# Patient Record
Sex: FEMALE | Race: WHITE | Employment: UNEMPLOYED | URBAN - METROPOLITAN AREA
[De-identification: names, ages, dates, MRNs, and addresses within clinical notes are randomized per-mention and may not be internally consistent; named-entity substitution may affect disease eponyms.]

---

## 2024-02-05 ENCOUNTER — APPOINTMENT (OUTPATIENT)
Dept: NEUROSURGERY | Facility: CLINIC | Age: 54
End: 2024-02-05
Payer: MEDICAID

## 2024-02-19 ENCOUNTER — OFFICE VISIT (OUTPATIENT)
Dept: NEUROSURGERY | Facility: CLINIC | Age: 54
End: 2024-02-19
Payer: MEDICAID

## 2024-02-19 VITALS
SYSTOLIC BLOOD PRESSURE: 128 MMHG | HEIGHT: 69 IN | TEMPERATURE: 97.7 F | BODY MASS INDEX: 26.66 KG/M2 | DIASTOLIC BLOOD PRESSURE: 82 MMHG | WEIGHT: 180 LBS

## 2024-02-19 DIAGNOSIS — M54.16 LUMBAR RADICULOPATHY, ACUTE: Primary | ICD-10-CM

## 2024-02-19 PROCEDURE — 99214 OFFICE O/P EST MOD 30 MIN: CPT | Performed by: STUDENT IN AN ORGANIZED HEALTH CARE EDUCATION/TRAINING PROGRAM

## 2024-02-19 RX ORDER — AMITRIPTYLINE HYDROCHLORIDE 25 MG/1
25 TABLET, FILM COATED ORAL
COMMUNITY
Start: 2022-03-28

## 2024-02-19 RX ORDER — LINACLOTIDE 290 UG/1
CAPSULE, GELATIN COATED ORAL
COMMUNITY
Start: 2020-11-20

## 2024-02-19 RX ORDER — ATORVASTATIN CALCIUM 40 MG/1
40 TABLET, FILM COATED ORAL DAILY
COMMUNITY
Start: 2022-04-21

## 2024-02-19 RX ORDER — ACETAMINOPHEN AND CODEINE PHOSPHATE 300; 30 MG/1; MG/1
1 TABLET ORAL
COMMUNITY
Start: 2023-10-24

## 2024-02-19 RX ORDER — CONJUGATED ESTROGENS AND MEDROXYPROGESTERONE ACETATE .45; 1.5 MG/1; MG/1
1 TABLET, SUGAR COATED ORAL DAILY
COMMUNITY
Start: 2024-01-22

## 2024-02-19 RX ORDER — BUDESONIDE, GLYCOPYRROLATE, AND FORMOTEROL FUMARATE 160; 9; 4.8 UG/1; UG/1; UG/1
AEROSOL, METERED RESPIRATORY (INHALATION)
COMMUNITY
Start: 2022-06-20

## 2024-02-19 RX ORDER — BUTALBITAL, ASPIRIN, AND CAFFEINE 325; 50; 40 MG/1; MG/1; MG/1
CAPSULE ORAL
COMMUNITY
Start: 2019-07-19

## 2024-02-19 RX ORDER — GABAPENTIN 100 MG/1
100 CAPSULE ORAL 2 TIMES DAILY
COMMUNITY
Start: 2019-11-21

## 2024-02-19 RX ORDER — LORATADINE 10 MG/1
TABLET ORAL
COMMUNITY
Start: 2022-03-28

## 2024-02-19 RX ORDER — PREGABALIN 50 MG/1
CAPSULE ORAL
COMMUNITY
Start: 2022-03-29

## 2024-02-19 RX ORDER — ADALIMUMAB 40MG/0.4ML
40 KIT SUBCUTANEOUS
COMMUNITY
Start: 2024-02-05

## 2024-02-19 RX ORDER — VENLAFAXINE 75 MG/1
75 TABLET ORAL EVERY 24 HOURS
COMMUNITY
Start: 2024-02-02

## 2024-02-19 RX ORDER — FLUTICASONE FUROATE, UMECLIDINIUM BROMIDE AND VILANTEROL TRIFENATATE 100; 62.5; 25 UG/1; UG/1; UG/1
1 POWDER RESPIRATORY (INHALATION) EVERY 24 HOURS
COMMUNITY
Start: 2024-01-26

## 2024-02-19 RX ORDER — TERBINAFINE HYDROCHLORIDE 250 MG/1
TABLET ORAL EVERY 24 HOURS
COMMUNITY
Start: 2024-02-16

## 2024-02-19 RX ORDER — TRAZODONE HYDROCHLORIDE 100 MG/1
TABLET ORAL
COMMUNITY
Start: 2023-06-16

## 2024-02-19 RX ORDER — HYDROXYZINE HYDROCHLORIDE 25 MG/1
TABLET, FILM COATED ORAL
COMMUNITY
Start: 2022-03-28

## 2024-02-19 RX ORDER — FAMOTIDINE 20 MG/1
20 TABLET, FILM COATED ORAL
COMMUNITY
Start: 2022-04-05

## 2024-02-19 RX ORDER — METOCLOPRAMIDE 5 MG/1
TABLET ORAL
COMMUNITY

## 2024-02-19 RX ORDER — BISACODYL 5 MG
5 TABLET, DELAYED RELEASE (ENTERIC COATED) ORAL EVERY 24 HOURS
COMMUNITY
Start: 2024-01-08

## 2024-02-19 RX ORDER — ZINC GLUCONATE 10 MG
LOZENGE ORAL
COMMUNITY

## 2024-02-19 RX ORDER — NALOXEGOL OXALATE 25 MG/1
TABLET, FILM COATED ORAL
COMMUNITY

## 2024-02-19 RX ORDER — TIOTROPIUM BROMIDE 18 UG/1
CAPSULE ORAL; RESPIRATORY (INHALATION)
COMMUNITY

## 2024-02-19 RX ORDER — DOCUSATE SODIUM 100 MG/1
100 CAPSULE ORAL EVERY 24 HOURS
COMMUNITY
Start: 2024-01-08

## 2024-02-19 RX ORDER — FLUTICASONE PROPIONATE 50 UG/1
SPRAY, METERED NASAL EVERY 24 HOURS
COMMUNITY
Start: 2019-12-30

## 2024-02-19 RX ORDER — PLECANATIDE 3 MG/1
TABLET ORAL EVERY 24 HOURS
COMMUNITY
Start: 2024-02-16

## 2024-02-19 RX ORDER — CLONAZEPAM 2 MG/1
2 TABLET ORAL
COMMUNITY
Start: 2019-07-19

## 2024-02-19 RX ORDER — LEVOTHYROXINE SODIUM 25 UG/1
TABLET ORAL
COMMUNITY
Start: 2022-04-22

## 2024-02-19 RX ORDER — CARISOPRODOL 350 MG/1
350 TABLET ORAL
COMMUNITY
Start: 2019-07-18

## 2024-02-19 RX ORDER — VARENICLINE TARTRATE 0.5 MG/1
TABLET, FILM COATED ORAL
COMMUNITY

## 2024-02-19 RX ORDER — LEMBOREXANT 10 MG/1
TABLET, FILM COATED ORAL
COMMUNITY
Start: 2023-06-16

## 2024-02-19 RX ORDER — LISDEXAMFETAMINE DIMESYLATE 50 MG/1
CAPSULE ORAL
COMMUNITY
Start: 2023-06-16

## 2024-02-19 RX ORDER — MECLIZINE HYDROCHLORIDE 25 MG/1
TABLET ORAL
COMMUNITY
Start: 2022-03-28

## 2024-02-19 RX ORDER — FOLIC ACID 1 MG/1
TABLET ORAL
COMMUNITY
Start: 2020-05-27

## 2024-02-19 RX ORDER — ERGOCALCIFEROL 1.25 MG/1
50000 CAPSULE ORAL
COMMUNITY
Start: 2020-04-19

## 2024-02-19 RX ORDER — ONDANSETRON HYDROCHLORIDE 8 MG/1
TABLET, FILM COATED ORAL EVERY 8 HOURS
COMMUNITY
Start: 2022-03-28

## 2024-02-19 RX ORDER — CYANOCOBALAMIN 1000 UG/ML
1000 INJECTION, SOLUTION INTRAMUSCULAR; SUBCUTANEOUS
COMMUNITY
Start: 2024-02-02

## 2024-02-19 ASSESSMENT — LIFESTYLE VARIABLES
HOW OFTEN DO YOU HAVE SIX OR MORE DRINKS ON ONE OCCASION: NEVER
HOW MANY STANDARD DRINKS CONTAINING ALCOHOL DO YOU HAVE ON A TYPICAL DAY: 1 OR 2
AUDIT-C TOTAL SCORE: 2
HOW OFTEN DO YOU HAVE A DRINK CONTAINING ALCOHOL: 2-4 TIMES A MONTH
SKIP TO QUESTIONS 9-10: 1

## 2024-02-19 ASSESSMENT — PATIENT HEALTH QUESTIONNAIRE - PHQ9
SUM OF ALL RESPONSES TO PHQ9 QUESTIONS 1 & 2: 6
8. MOVING OR SPEAKING SO SLOWLY THAT OTHER PEOPLE COULD HAVE NOTICED. OR THE OPPOSITE, BEING SO FIGETY OR RESTLESS THAT YOU HAVE BEEN MOVING AROUND A LOT MORE THAN USUAL: NEARLY EVERY DAY
4. FEELING TIRED OR HAVING LITTLE ENERGY: NEARLY EVERY DAY
9. THOUGHTS THAT YOU WOULD BE BETTER OFF DEAD, OR OF HURTING YOURSELF: NOT AT ALL
2. FEELING DOWN, DEPRESSED OR HOPELESS: NEARLY EVERY DAY
10. IF YOU CHECKED OFF ANY PROBLEMS, HOW DIFFICULT HAVE THESE PROBLEMS MADE IT FOR YOU TO DO YOUR WORK, TAKE CARE OF THINGS AT HOME, OR GET ALONG WITH OTHER PEOPLE: SOMEWHAT DIFFICULT
5. POOR APPETITE OR OVEREATING: NEARLY EVERY DAY
3. TROUBLE FALLING OR STAYING ASLEEP: NEARLY EVERY DAY
1. LITTLE INTEREST OR PLEASURE IN DOING THINGS: NEARLY EVERY DAY
6. FEELING BAD ABOUT YOURSELF - OR THAT YOU ARE A FAILURE OR HAVE LET YOURSELF OR YOUR FAMILY DOWN: MORE THAN HALF THE DAYS
7. TROUBLE CONCENTRATING ON THINGS, SUCH AS READING THE NEWSPAPER OR WATCHING TELEVISION: MORE THAN HALF THE DAYS
SUM OF ALL RESPONSES TO PHQ QUESTIONS 1-9: 22

## 2024-02-19 NOTE — PROGRESS NOTES
[facility] [place]  Department of Neurological Surgery  Established Patient Visit    History of Present Illness  Joi Castillo is a 53 y.o. year old female who presents to the spine clinic in follow up with severe right leg pain. Prior right L5-S1 discectomy. Unfortuantely the pain recurred after surgery. The pain has been severe since recurring after surgery. Pain in same distribution than before surgery in right leg and back. Difficulty with walking, performing normal activities.     Patient's BMI is Body mass index is 26.58 kg/m².    14/14 systems reviewed and negative other than what is listed in the history of present illness    There is no problem list on file for this patient.    No past medical history on file.  No past surgical history on file.  Social History     Tobacco Use    Smoking status: Every Day     Types: Cigarettes    Smokeless tobacco: Not on file   Substance Use Topics    Alcohol use: Yes     family history is not on file.    Current Outpatient Medications:     acetaminophen-codeine (Tylenol w/ Codeine #3) 300-30 mg tablet, 1 tablet., Disp: , Rfl:     amitriptyline (Elavil) 25 mg tablet, Take 1 tablet (25 mg) by mouth., Disp: , Rfl:     atorvastatin (Lipitor) 40 mg tablet, Take 1 tablet (40 mg) by mouth once daily., Disp: , Rfl:     budesonide-glycopyr-formoterol (Breztri Aerosphere) 160-9-4.8 mcg/actuation HFA aerosol inhaler, 2 puffs, Inhalation, TID, rinse mouth and throat after use, # 10.7 g, Refill(s) 0, Date: 6/20/22 12:31:00 PM EDT, Disp: , Rfl:     butalbital-aspirin-caffeine (Fiorinal) -40 mg capsule, , Disp: , Rfl:     carisoprodol (Soma) 350 mg tablet, Take 1 tablet (350 mg) by mouth., Disp: , Rfl:     clonazePAM (KlonoPIN) 2 mg tablet, Take 1 tablet (2 mg) by mouth., Disp: , Rfl:     Colace 100 mg capsule, 1 capsule (100 mg) once every 24 hours., Disp: , Rfl:     cyanocobalamin (Vitamin B-12) 1,000 mcg/mL injection, Inject 1 mL (1,000 mcg) into the muscle  every 30 (thirty) days., Disp: , Rfl:     Dayvigo 10 mg tablet, Take by mouth., Disp: , Rfl:     Dulcolax, bisacodyl, 5 mg EC tablet, 1 tablet (5 mg) once every 24 hours., Disp: , Rfl:     ergocalciferol (Vitamin D-2) 1.25 MG (28110 UT) capsule, Take 1 capsule (50,000 Units) by mouth., Disp: , Rfl:     famotidine (Pepcid) 20 mg tablet, 1 tablet (20 mg)., Disp: , Rfl:     Flonase Allergy Relief 50 mcg/actuation nasal spray, once every 24 hours., Disp: , Rfl:     fluticasone-umeclidin-vilanter (Trelegy Ellipta) 100-62.5-25 mcg blister with device, 1 puff once every 24 hours., Disp: , Rfl:     folic acid (Folvite) 1 mg tablet, , Disp: , Rfl:     gabapentin (Neurontin) 100 mg capsule, Take 1 capsule (100 mg) by mouth twice a day., Disp: , Rfl:     Humira,CF, Pen 40 mg/0.4 mL pen injector kit pen-injector, Inject 1 Pen (40 mg) under the skin., Disp: , Rfl:     hydrOXYzine HCL (Atarax) 25 mg tablet, 1 Tablet Orally Once a day in the afternoon for 90 days, Disp: , Rfl:     levothyroxine (Synthroid, Levoxyl) 25 mcg tablet, Take by mouth., Disp: , Rfl:     Linzess 290 mcg capsule, Take by mouth., Disp: , Rfl:     loratadine (Claritin) 10 mg tablet, Take by mouth., Disp: , Rfl:     Lubricant Eye, PG-, 0.4-0.3 % drops ophthalmic drops, INSTILL INTO THE AFFECTED EYE(S) AS DIRECTED TWICE A DAY FOR 14 DAYS. for 14, Disp: , Rfl:     meclizine (Antivert) 25 mg tablet, Take by mouth., Disp: , Rfl:     metoclopramide (Reglan) 5 mg tablet, TAKE ONE TABLET BY MOUTH BEFORE MEALS TWICE A DAY FOR 10 DAYS for 10, Disp: , Rfl:     Movantik 25 mg, Take by mouth., Disp: , Rfl:     ondansetron (Zofran) 8 mg tablet, every 8 hours., Disp: , Rfl:     plecanatide (Trulance) tablet tablet, once every 24 hours., Disp: , Rfl:     pregabalin (Lyrica) 50 mg capsule, Take by mouth., Disp: , Rfl:     Prempro 0.45-1.5 mg tablet, Take 1 tablet by mouth once daily., Disp: , Rfl:     rimegepant (Nurtec ODT) 75 mg tablet,disintegrating, DISSOLVE ONE  TABLET ON THE TONGUE ONCE DAILY AS NEEDED, Disp: , Rfl:     sennosides (Senokot) 4.3 mg tablet (HALF TABLET), 2 half tablet (1 tablet)., Disp: , Rfl:     sennosides/docusate sodium (VEGETABLE LAX-STOOL SOFTENER ORAL), Take 2 tablets by mouth as needed at bedtime., Disp: , Rfl:     terbinafine (LamISIL) 250 mg tablet, once every 24 hours., Disp: , Rfl:     tiotropium (Spiriva with HandiHaler) 18 mcg inhalation capsule, Place into inhaler and inhale., Disp: , Rfl:     traZODone (Desyrel) 100 mg tablet, Take by mouth., Disp: , Rfl:     varenicline (Chantix) 0.5 mg tablet, TAKE ONE TABLET BY MOUTH ONCE A DAY AFTER EATING WITH A FULL GLASS OF WATER for 30, Disp: , Rfl:     venlafaxine (Effexor) 75 mg tablet, 1 tablet (75 mg) once every 24 hours., Disp: , Rfl:     Vyvanse 50 mg capsule, , Disp: , Rfl:   Allergies   Allergen Reactions    Codeine Unknown    Nsaids (Non-Steroidal Anti-Inflammatory Drug) Unknown     Upsets her stomach       Physical Examination:    General: Well developed, awake/alert/oriented x3, no distress, alert and cooperative  Skin: Warm and dry, no lesions, no rashes  ENMT: Mucous membranes moist, no apparent injury, no lesions seen  Head/Neck: Neck Supple, no apparent injury  Respiratory/Thorax: Normal breath sounds with good chest expansion, thorax symmetric  Cardiovascular: No pitting edema, no JVD    Motor Strength: 5/5 Throughout all extremities    Muscle Bulk: Normal and symmetric in all extremities    Posture:   -- Cervical: Normal  -- Thoracic: Normal  -- Lumbar : Normal  Paraspinal muscle spasm/tenderness absent.     Sensation: intact to light touch  RLE pain in L5 and S1     Results:  I personally reviewed and interpreted the imaging results which included left sided recurrent disc herniation at L5-S1     Assessment and Plan:      Joi Castillo is a 53 y.o. year old female who presents to the spine clinic in follow up with recurrent L5-S1 disc herniation, has been attempting to do  conservative care, shots, PT not working.     Unfortunately is heading back towards needing a discectomy again versus a spinal fusion. She must be nicotine free before she can consider surgery.     I have personally reviewed and interpreted the imaging and detailed diagnosis with the patient, discussed the natural history of their disease and both non-operative and operative treatments available and rationale and the risks for all options.    The patient’s clinical symptoms correlates well with the radiological findings. Patient has been having significant functional impairment with decreased ability to perform her normal activities of daily living. They have tried treatment options including medications (NSAIDs/narcotics/muscle relaxants/membrane stabilizers), formal physical therapy, and injections.    I offered the option of surgery that would consist of a redo L5-S1 laminectomy, redo right sided discectomy    I have explained the surgical procedure in detail with expected duration and extent of recovery along risks of surgery that include, but is not limited to bleeding, infection, blood vessel injury or damage, loss of sensation, loss of bladder, bowel or sexual function, nerve injury/damage resulting in weakness/paralysis, malunion, nonunion, CSF leak, brachial plexus injury, peripheral vision blindness, failure of implants/fusion, failure to relieve symptoms, recurrent disease, adjacent segment disease, need to reoperate for any reason and general anesthesia reaction such as stroke, coma, heart attack, delirium, confusion, death as well as worsening of preexisted medical conditions.    I clearly emphasized that while the goal of surgery is to decompress the spinal cord so as to ARREST the progression of neurological deficits - preexisting deficits may or may not improve after surgery. We discussed that many patients do clinically improve in functional and neurological outcomes following decompression of the  spinal elements in patients with lumbar degenerative disease or radiculopathy the extent of which is variable and depends on the severity of pain, numbness, tingling, or weakness. With improvement seen of those symptoms in that order. We did discuss the goal of surgery to alleviate pain first and foremost and hope for recovery of all neurologic function with time.    All questions were answered and the patient left satisfied with the surgical plan moving forward.        I have reviewed all prior documentation and reviewed the electronic medical record since admission. I have personally have reviewed all advanced imaging not just the reports and used my interpretation as documented as the relevant findings. I have reviewed the risks and benefits of all treatment recommendations listed in this note with the patient and family. I spent a total of 35 minutes in service to this patient's care during this date of service.      The above clinical summary has been dictated with voice recognition software. It has not been proofread for grammatical errors, typographical mistakes, or other semantic inconsistencies.    Thank you for visiting our office today. It was our pleasure to take part in your healthcare.     Do not hesitate to call with any questions regarding your plan of care after leaving at [phone] M-F 8am-4pm.     To clinicians, thank you very much for this kind referral. It is a privilege to partner with you in the care of your patients. My office would be delighted to assist you with any further consultations or with questions regarding the plan of care outlined. Do not hesitate to call the office or contact me directly.       Sincerely,      Xu Ibarra MD  Director, [facility] [place]   of Neurosurgery, [facility] and [facility]  Complex Spine Fellowship Director  , Department of Neurological Surgery  [facility]  [employer] [facility]  [address]  [address]  [place] [address]    [facility]  [address]  [address]  [place] [address]    Phone: [phone]  Fax: [phone]

## 2024-03-18 DIAGNOSIS — M54.16 LUMBAR RADICULOPATHY, ACUTE: Primary | ICD-10-CM

## 2024-04-25 ENCOUNTER — OUTSIDE PROCEDURE (OUTPATIENT)
Dept: NEUROSURGERY | Facility: HOSPITAL | Age: 54
End: 2024-04-25
Payer: MEDICAID

## 2024-04-25 DIAGNOSIS — M54.16 LUMBAR RADICULOPATHY: Primary | ICD-10-CM

## 2024-04-25 PROCEDURE — 63047 LAM FACETEC & FORAMOT LUMBAR: CPT | Performed by: STUDENT IN AN ORGANIZED HEALTH CARE EDUCATION/TRAINING PROGRAM

## 2024-05-13 NOTE — PROGRESS NOTES
"Joi Castillo is a 53 y.o. female who is here for her 1st post op visit. She underwent Re-Do L5 - S1 laminectomy, discectomy on 04/25/2024, at [facility] by Dr. Xu Ibarra. She was discharged home on 04/28/2024.    Since discharge from the hospital, she feels she's progressing slowly. She is tolerating pain medications. Activity level - she is able to complete ADLs with minimal assistance     Smoker: YES  Anticoagulation: No    /88   Pulse 81   Temp 36.5 °C (97.7 °F) (Temporal)   Ht 1.753 m (5' 9\")   Wt 81.6 kg (180 lb)   BMI 26.58 kg/m²     On exam:  A&O x 3, speech clear / fluent  Respirations even / unlabored  RAMOS while seated  SURGICAL INCISION is: well approximated, no erythema / swelling / drainage  Gait is steady forward and backwards    Joi Castillo is progressing well post operatively. OARRS review shows no suspicious activity. Oxycodone refilled. he agrees to follow up with Dr. Ibarra as previously scheduled, 07/15/2024.  Amanda Salazar, APRN-CNP      "

## 2024-05-15 ENCOUNTER — OFFICE VISIT (OUTPATIENT)
Dept: NEUROSURGERY | Facility: CLINIC | Age: 54
End: 2024-05-15
Payer: MEDICAID

## 2024-05-15 VITALS
HEIGHT: 69 IN | WEIGHT: 180 LBS | SYSTOLIC BLOOD PRESSURE: 124 MMHG | TEMPERATURE: 97.7 F | HEART RATE: 81 BPM | DIASTOLIC BLOOD PRESSURE: 88 MMHG | BODY MASS INDEX: 26.66 KG/M2

## 2024-05-15 DIAGNOSIS — Z98.890 S/P LUMBAR LAMINECTOMY: Primary | ICD-10-CM

## 2024-05-15 DIAGNOSIS — G89.18 ACUTE POST-OPERATIVE PAIN: ICD-10-CM

## 2024-05-15 PROBLEM — F32.A DEPRESSIVE DISORDER: Status: ACTIVE | Noted: 2020-02-12

## 2024-05-15 PROBLEM — K58.9 IBS (IRRITABLE BOWEL SYNDROME): Status: ACTIVE | Noted: 2020-08-19

## 2024-05-15 PROBLEM — F90.9 ATTENTION DEFICIT HYPERACTIVITY DISORDER: Status: ACTIVE | Noted: 2020-02-12

## 2024-05-15 PROBLEM — G93.32 CHRONIC FATIGUE SYNDROME: Status: ACTIVE | Noted: 2020-02-12

## 2024-05-15 PROBLEM — F41.1 GENERALIZED ANXIETY DISORDER: Status: ACTIVE | Noted: 2020-02-12

## 2024-05-15 PROCEDURE — 99024 POSTOP FOLLOW-UP VISIT: CPT | Performed by: NURSE PRACTITIONER

## 2024-05-15 RX ORDER — OXYCODONE HYDROCHLORIDE 5 MG/1
5 TABLET ORAL EVERY 6 HOURS PRN
Qty: 28 TABLET | Refills: 0 | Status: SHIPPED | OUTPATIENT
Start: 2024-05-15 | End: 2024-05-22

## 2024-05-15 ASSESSMENT — PATIENT HEALTH QUESTIONNAIRE - PHQ9
SUM OF ALL RESPONSES TO PHQ QUESTIONS 1-9: 10
2. FEELING DOWN, DEPRESSED OR HOPELESS: SEVERAL DAYS
5. POOR APPETITE OR OVEREATING: NOT AT ALL
4. FEELING TIRED OR HAVING LITTLE ENERGY: NOT AT ALL
6. FEELING BAD ABOUT YOURSELF - OR THAT YOU ARE A FAILURE OR HAVE LET YOURSELF OR YOUR FAMILY DOWN: MORE THAN HALF THE DAYS
1. LITTLE INTEREST OR PLEASURE IN DOING THINGS: MORE THAN HALF THE DAYS
7. TROUBLE CONCENTRATING ON THINGS, SUCH AS READING THE NEWSPAPER OR WATCHING TELEVISION: MORE THAN HALF THE DAYS
SUM OF ALL RESPONSES TO PHQ9 QUESTIONS 1 AND 2: 3
3. TROUBLE FALLING OR STAYING ASLEEP OR SLEEPING TOO MUCH: NEARLY EVERY DAY
9. THOUGHTS THAT YOU WOULD BE BETTER OFF DEAD, OR OF HURTING YOURSELF: NOT AT ALL
8. MOVING OR SPEAKING SO SLOWLY THAT OTHER PEOPLE COULD HAVE NOTICED. OR THE OPPOSITE, BEING SO FIGETY OR RESTLESS THAT YOU HAVE BEEN MOVING AROUND A LOT MORE THAN USUAL: NOT AT ALL

## 2024-05-15 ASSESSMENT — PAIN SCALES - GENERAL: PAINLEVEL: 7

## 2024-05-15 ASSESSMENT — ENCOUNTER SYMPTOMS: OCCASIONAL FEELINGS OF UNSTEADINESS: 1

## 2024-05-21 ENCOUNTER — TELEPHONE (OUTPATIENT)
Dept: NEUROSURGERY | Facility: CLINIC | Age: 54
End: 2024-05-21
Payer: MEDICAID

## 2024-05-21 DIAGNOSIS — Z98.890 S/P LUMBAR LAMINECTOMY: Primary | ICD-10-CM

## 2024-06-03 ENCOUNTER — TELEPHONE (OUTPATIENT)
Dept: NEUROSURGERY | Facility: CLINIC | Age: 54
End: 2024-06-03
Payer: MEDICAID

## 2024-07-15 ENCOUNTER — APPOINTMENT (OUTPATIENT)
Dept: NEUROSURGERY | Facility: CLINIC | Age: 54
End: 2024-07-15
Payer: MEDICAID

## 2024-07-15 VITALS
HEIGHT: 69 IN | SYSTOLIC BLOOD PRESSURE: 128 MMHG | HEART RATE: 96 BPM | WEIGHT: 180 LBS | TEMPERATURE: 97.5 F | BODY MASS INDEX: 26.66 KG/M2 | DIASTOLIC BLOOD PRESSURE: 80 MMHG

## 2024-07-15 DIAGNOSIS — M54.16 LUMBAR RADICULOPATHY: Primary | ICD-10-CM

## 2024-07-15 DIAGNOSIS — Z98.890 S/P LUMBAR LAMINECTOMY: ICD-10-CM

## 2024-07-15 PROCEDURE — 99024 POSTOP FOLLOW-UP VISIT: CPT | Performed by: STUDENT IN AN ORGANIZED HEALTH CARE EDUCATION/TRAINING PROGRAM

## 2024-07-15 ASSESSMENT — PATIENT HEALTH QUESTIONNAIRE - PHQ9
3. TROUBLE FALLING OR STAYING ASLEEP: NEARLY EVERY DAY
4. FEELING TIRED OR HAVING LITTLE ENERGY: NEARLY EVERY DAY
10. IF YOU CHECKED OFF ANY PROBLEMS, HOW DIFFICULT HAVE THESE PROBLEMS MADE IT FOR YOU TO DO YOUR WORK, TAKE CARE OF THINGS AT HOME, OR GET ALONG WITH OTHER PEOPLE: VERY DIFFICULT
7. TROUBLE CONCENTRATING ON THINGS, SUCH AS READING THE NEWSPAPER OR WATCHING TELEVISION: MORE THAN HALF THE DAYS
SUM OF ALL RESPONSES TO PHQ QUESTIONS 1-9: 16
9. THOUGHTS THAT YOU WOULD BE BETTER OFF DEAD, OR OF HURTING YOURSELF: NOT AT ALL
2. FEELING DOWN, DEPRESSED OR HOPELESS: MORE THAN HALF THE DAYS
5. POOR APPETITE OR OVEREATING: SEVERAL DAYS
6. FEELING BAD ABOUT YOURSELF - OR THAT YOU ARE A FAILURE OR HAVE LET YOURSELF OR YOUR FAMILY DOWN: MORE THAN HALF THE DAYS
SUM OF ALL RESPONSES TO PHQ9 QUESTIONS 1 & 2: 4
1. LITTLE INTEREST OR PLEASURE IN DOING THINGS: MORE THAN HALF THE DAYS
8. MOVING OR SPEAKING SO SLOWLY THAT OTHER PEOPLE COULD HAVE NOTICED. OR THE OPPOSITE, BEING SO FIGETY OR RESTLESS THAT YOU HAVE BEEN MOVING AROUND A LOT MORE THAN USUAL: SEVERAL DAYS

## 2024-07-15 ASSESSMENT — PAIN SCALES - GENERAL: PAINLEVEL: 7

## 2024-07-15 NOTE — PROGRESS NOTES
[facility] [place]  Department of Neurological Surgery  Post Operative Patient Visit      History of Present Illness:  Joi Castillo is a 53 y.o. year old female who presents to the spine clinic in a post operative visit. Since surgery they are 2-3 months s/p redo L5-S1 laminectomy and discectomy on 4/25/2024. She continues with low back pain. She has had multiple falls since surgery. She walks with a walker. She has minimal right leg pain but continues with significant mechanical back pain when performing ADLs. She is significantly impaired secondary to her back. She has no weakness and no leg pain. I will get an x-ray today to evaluate her low back. She will continue with conservative treatment including PT. I will refer her back to pain management. She will discuss her disability with her PCP as she cannot work at this time.       The above clinical summary has been dictated with voice recognition software. It has not been proofread for grammatical errors, typographical mistakes, or other semantic inconsistencies.    Thank you for visiting our office today. It was our pleasure to take part in your healthcare.     Do not hesitate to call with any questions regarding your plan of care after leaving at [phone] M-F 8am-4pm.     To clinicians, thank you very much for this kind referral. It is a privilege to partner with you in the care of your patients. My office would be delighted to assist you with any further consultations or with questions regarding the plan of care outlined. Do not hesitate to call the office or contact me directly.       Sincerely,      Xu Ibarra MD, [facility]  Spine , [facility]  Eduar Tamez and Dalia Tamez Chair in Spinal Neurosurgery  Neurosurgery , [facility] and [facility]  Complex Spine Surgery Fellowship Director   of Neurological Surgery  Case  [employer]    [facility]  [address]  [address]  [place] [address]    [facility]  [address]  [address]  [place] [address]    Phone: [phone]  Fax: [phone]        Scribe Attestation  By signing my name below, I, Meghan Medel , Scribe   attest that this documentation has been prepared under the direction and in the presence of Xu Ibarra MD.

## 2024-07-18 ENCOUNTER — TELEPHONE (OUTPATIENT)
Dept: NEUROSURGERY | Facility: CLINIC | Age: 54
End: 2024-07-18
Payer: MEDICAID

## 2025-01-23 ENCOUNTER — APPOINTMENT (OUTPATIENT)
Dept: NEUROLOGY | Facility: CLINIC | Age: 55
End: 2025-01-23
Payer: MEDICAID

## 2025-06-05 ENCOUNTER — APPOINTMENT (OUTPATIENT)
Dept: NEUROSURGERY | Facility: CLINIC | Age: 55
End: 2025-06-05
Payer: MEDICAID

## 2025-06-20 ENCOUNTER — OFFICE VISIT (OUTPATIENT)
Dept: NEUROLOGY | Facility: CLINIC | Age: 55
End: 2025-06-20
Payer: MEDICAID

## 2025-06-20 VITALS
BODY MASS INDEX: 27.4 KG/M2 | WEIGHT: 185 LBS | DIASTOLIC BLOOD PRESSURE: 93 MMHG | HEIGHT: 69 IN | SYSTOLIC BLOOD PRESSURE: 129 MMHG | HEART RATE: 94 BPM

## 2025-06-20 DIAGNOSIS — G43.109 MIGRAINE WITH AURA AND WITHOUT STATUS MIGRAINOSUS, NOT INTRACTABLE: Primary | ICD-10-CM

## 2025-06-20 PROCEDURE — 99204 OFFICE O/P NEW MOD 45 MIN: CPT | Performed by: NURSE PRACTITIONER

## 2025-06-20 PROCEDURE — 99214 OFFICE O/P EST MOD 30 MIN: CPT | Performed by: NURSE PRACTITIONER

## 2025-06-20 PROCEDURE — 3008F BODY MASS INDEX DOCD: CPT | Performed by: NURSE PRACTITIONER

## 2025-06-20 ASSESSMENT — PATIENT HEALTH QUESTIONNAIRE - PHQ9
SUM OF ALL RESPONSES TO PHQ9 QUESTIONS 1 & 2: 0
1. LITTLE INTEREST OR PLEASURE IN DOING THINGS: NOT AT ALL
2. FEELING DOWN, DEPRESSED OR HOPELESS: NOT AT ALL
SUM OF ALL RESPONSES TO PHQ9 QUESTIONS 1 AND 2: 0
2. FEELING DOWN, DEPRESSED OR HOPELESS: NOT AT ALL
1. LITTLE INTEREST OR PLEASURE IN DOING THINGS: NOT AT ALL

## 2025-06-20 ASSESSMENT — ENCOUNTER SYMPTOMS
LOSS OF SENSATION IN FEET: 0
OCCASIONAL FEELINGS OF UNSTEADINESS: 0
DEPRESSION: 0

## 2025-06-20 NOTE — PROGRESS NOTES
Chief Complaint   Patient presents with    Migraine     NPV FOR MIGRAINE       Subjective   HPI  Joi Castillo is a 54 y.o. year old female who presents with chief complaint Migraine with aura and without status migrainosus, not intractable [G43.109]    Joi started getting headaches since child.    Headaches gradually worsening in frequency and severity.   Currently experiencing 30 HA days per month.   Triggers include barometric pressure , stress, and humidity.  Aura  The headaches are usually severe and throbbing and are located holocephalic, generally symmetric.   Generally, headaches are constant in duration.   Varies in intensity.  Associated photophobia, phonophobia, nausea, vomiting, and vestibular symptoms  The headaches are not positional.   Headaches are worsened with exertion. No change in character with sneezing, coughing and bending over.   The current rescue medication are not effective.  Sleep:  3-4 hours per night  Caffeine:  minimal  Head or neck injuries/MVC:  denies  Previous head CT/ brain MRI? None for brain to review  Recent labs?  Reviewed   Endorses regular vision checkups.  Endorses regular dental checkups.  Denies other neurological history of seizures, stroke, or TIA.  Follows with pain management and neurospine    Medications  Current & Past Treatments:  Preventive:  Venlafaxine  Amitriptyline  Gabapentin  Pregabalin  Nurtec    Rescue:  Acetaminophen  Ibuprofen  Sumatriptan  Rizatriptan  Ubrelvy      Current Medications[1]  RX Allergies[2]  Medical History[3]  Surgical History[4]  Family History[5]  Social History     Tobacco Use    Smoking status: Every Day     Types: Cigarettes    Smokeless tobacco: Not on file    Tobacco comments:     SMOKES CIGARETTES DAILY   Substance Use Topics    Alcohol use: Not Currently     Comment: Socially     Social History     Substance and Sexual Activity   Drug Use Not Currently        ROS  As noted in HPI, otherwise all other systems have been  "reviewed are negative for complaint.     Objective   General Appearance: Joi is well-developed, well-nourished, 54 y.o. year old female, in no acute distress. Makes good eye contact, is alert, interactive, and cooperative. Demonstrates recent & remote memory recall. Subjective information consistent with objective assessment.     Vitals:    06/20/25 1023   BP: (!) 129/93   Pulse: 94   Weight: 83.9 kg (185 lb)   Height: 1.753 m (5' 9\")       Lab Results   Component Value Date    WBC 5.3 08/17/2023    RBC 4.34 08/17/2023    HGB 13.2 08/17/2023    HCT 40.7 08/17/2023     08/17/2023     08/17/2023    K 3.9 08/17/2023     08/17/2023    BUN 10 08/17/2023    CREATININE 0.78 08/17/2023    CALCIUM 9.5 08/17/2023    ALKPHOS 69 08/17/2023    AST 19 08/17/2023    ALT 14 08/17/2023        MENTAL STATUS:  Patient Health Questionnaire-2 Score: 0       HEADACHE EXAM:  No tenderness to palpation during both active and passive ROM testing in the cervicogenic region  No tenderness to palpation in bilateral occipital notches  No tenderness to palpation in bilateral temples  No tenderness to palpation during TMJ testing      Eye Exam  OPHTHALMOSCOPIC:   The ophthalmoscopic exam was normal. The fundi were well visualized with normal disc margins, clear vessels and vascular pulsations. No disc edema. The cup/disk ratio was not enlarged. No hemorrhages or exudates were present in the posterior segments that were visualized.     Neurological Exam  CRANIAL NERVES:   CN III, IV, VI: Extraocular movements intact bilaterally. Normal lids and orbits bilaterally.  CN VII: Full and symmetric facial movement.  CN VIII: Hearing is normal.    GAIT:   Station stable with a normal base. Gait stable with a normal arm swing and speed. No ataxia, shuffling, steppage or waddling present. No circumduction was present.   No Romberg sign present.    RECORDS REVIEW:  Previous records (provider notes, laboratory reports, and imaging " studies were reviewed and summarized).  Unable to locate neurology notes as they are not available in the epic substance.  Request submitted for notes from Dr. Santos.  No MRI head results found for the past 12 months     No CT head results found for the past 12 months    No CT Angio Head Results found for the past 14 days     No CT Angio Neck Results found for the past  year     Assessment & Plan  Migraine with aura and without status migrainosus, not intractable              ASSESSMENT/PLAN:  Start Botox for preventative treatment of migraine.  Follow-up will be pending prior authorization of Botox.  Okay for patient to continue on the amitriptyline 25 mg and gabapentin 100 mg twice daily.  These are managed by her other neurology office.  Patient to schedule follow-up appointment with pain management.  Patient to schedule follow-up appointment with neuro spine.        Tameka Jaime, APRN-CNP            [1]   Current Outpatient Medications:     acetaminophen-codeine (Tylenol w/ Codeine #3) 300-30 mg tablet, 1 tablet., Disp: , Rfl:     amitriptyline (Elavil) 25 mg tablet, Take 1 tablet (25 mg) by mouth., Disp: , Rfl:     atorvastatin (Lipitor) 40 mg tablet, Take 1 tablet (40 mg) by mouth once daily., Disp: , Rfl:     budesonide-glycopyr-formoterol (Breztri Aerosphere) 160-9-4.8 mcg/actuation HFA aerosol inhaler, 2 puffs, Inhalation, TID, rinse mouth and throat after use, # 10.7 g, Refill(s) 0, Date: 6/20/22 12:31:00 PM EDT, Disp: , Rfl:     butalbital-aspirin-caffeine (Fiorinal) -40 mg capsule, , Disp: , Rfl:     carisoprodol (Soma) 350 mg tablet, Take 1 tablet (350 mg) by mouth., Disp: , Rfl:     clonazePAM (KlonoPIN) 2 mg tablet, Take 1 tablet (2 mg) by mouth., Disp: , Rfl:     Colace 100 mg capsule, 1 capsule (100 mg) once every 24 hours., Disp: , Rfl:     cyanocobalamin (Vitamin B-12) 1,000 mcg/mL injection, Inject 1 mL (1,000 mcg) into the muscle every 30 (thirty) days., Disp: , Rfl:     Dayvigo 10 mg  tablet, Take by mouth., Disp: , Rfl:     Dulcolax, bisacodyl, 5 mg EC tablet, 1 tablet (5 mg) once every 24 hours., Disp: , Rfl:     ergocalciferol (Vitamin D-2) 1.25 MG (58246 UT) capsule, Take 1 capsule (50,000 Units) by mouth., Disp: , Rfl:     famotidine (Pepcid) 20 mg tablet, 1 tablet (20 mg)., Disp: , Rfl:     Flonase Allergy Relief 50 mcg/actuation nasal spray, once every 24 hours., Disp: , Rfl:     fluticasone-umeclidin-vilanter (Trelegy Ellipta) 100-62.5-25 mcg blister with device, 1 puff once every 24 hours., Disp: , Rfl:     folic acid (Folvite) 1 mg tablet, , Disp: , Rfl:     gabapentin (Neurontin) 100 mg capsule, Take 1 capsule (100 mg) by mouth twice a day., Disp: , Rfl:     Humira,CF, Pen 40 mg/0.4 mL pen injector kit pen-injector, Inject 1 Pen (40 mg) under the skin., Disp: , Rfl:     hydrOXYzine HCL (Atarax) 25 mg tablet, 1 Tablet Orally Once a day in the afternoon for 90 days, Disp: , Rfl:     levothyroxine (Synthroid, Levoxyl) 25 mcg tablet, Take by mouth., Disp: , Rfl:     Linzess 290 mcg capsule, Take by mouth., Disp: , Rfl:     loratadine (Claritin) 10 mg tablet, Take by mouth., Disp: , Rfl:     Lubricant Eye, PG-, 0.4-0.3 % drops ophthalmic drops, INSTILL INTO THE AFFECTED EYE(S) AS DIRECTED TWICE A DAY FOR 14 DAYS. for 14, Disp: , Rfl:     meclizine (Antivert) 25 mg tablet, Take by mouth., Disp: , Rfl:     metoclopramide (Reglan) 5 mg tablet, TAKE ONE TABLET BY MOUTH BEFORE MEALS TWICE A DAY FOR 10 DAYS for 10, Disp: , Rfl:     Movantik 25 mg, Take by mouth., Disp: , Rfl:     ondansetron (Zofran) 8 mg tablet, every 8 hours., Disp: , Rfl:     plecanatide (Trulance) tablet tablet, once every 24 hours., Disp: , Rfl:     pregabalin (Lyrica) 50 mg capsule, Take by mouth., Disp: , Rfl:     Prempro 0.45-1.5 mg tablet, Take 1 tablet by mouth once daily., Disp: , Rfl:     rimegepant (Nurtec ODT) 75 mg tablet,disintegrating, DISSOLVE ONE TABLET ON THE TONGUE ONCE DAILY AS NEEDED, Disp: , Rfl:      sennosides (Senokot) 4.3 mg tablet (HALF TABLET), 2 half tablet (1 tablet)., Disp: , Rfl:     sennosides/docusate sodium (VEGETABLE LAX-STOOL SOFTENER ORAL), Take 2 tablets by mouth as needed at bedtime., Disp: , Rfl:     terbinafine (LamISIL) 250 mg tablet, once every 24 hours., Disp: , Rfl:     tiotropium (Spiriva with HandiHaler) 18 mcg inhalation capsule, Place into inhaler and inhale., Disp: , Rfl:     traZODone (Desyrel) 100 mg tablet, Take by mouth., Disp: , Rfl:     varenicline (Chantix) 0.5 mg tablet, TAKE ONE TABLET BY MOUTH ONCE A DAY AFTER EATING WITH A FULL GLASS OF WATER for 30, Disp: , Rfl:     venlafaxine (Effexor) 75 mg tablet, 1 tablet (75 mg) once every 24 hours., Disp: , Rfl:     Vyvanse 50 mg capsule, , Disp: , Rfl:   [2]   Allergies  Allergen Reactions    Codeine Unknown    Nsaids (Non-Steroidal Anti-Inflammatory Drug) Unknown     Upsets her stomach   [3] History reviewed. No pertinent past medical history.  [4] History reviewed. No pertinent surgical history.  [5] No family history on file.

## 2025-07-08 ENCOUNTER — APPOINTMENT (OUTPATIENT)
Dept: NEUROLOGY | Facility: CLINIC | Age: 55
End: 2025-07-08
Payer: MEDICAID

## 2025-07-08 ENCOUNTER — SPECIALTY PHARMACY (OUTPATIENT)
Dept: PHARMACY | Facility: CLINIC | Age: 55
End: 2025-07-08

## 2025-07-09 ENCOUNTER — TELEPHONE (OUTPATIENT)
Dept: PAIN MEDICINE | Facility: CLINIC | Age: 55
End: 2025-07-09
Payer: MEDICAID

## 2025-07-09 NOTE — TELEPHONE ENCOUNTER
Placed call, spoke with patient about scheduling initial consultation, referral from Dr. Dima Johnson.  Patient stated she already has an existing appointment closer to home, she would rather stay with that provider.

## 2025-07-11 ENCOUNTER — SPECIALTY PHARMACY (OUTPATIENT)
Dept: PHARMACY | Facility: CLINIC | Age: 55
End: 2025-07-11

## 2025-07-11 PROCEDURE — RXMED WILLOW AMBULATORY MEDICATION CHARGE

## 2025-07-12 ENCOUNTER — PHARMACY VISIT (OUTPATIENT)
Dept: PHARMACY | Facility: CLINIC | Age: 55
End: 2025-07-12
Payer: MEDICAID

## 2025-07-15 ENCOUNTER — SPECIALTY PHARMACY (OUTPATIENT)
Dept: PHARMACY | Facility: CLINIC | Age: 55
End: 2025-07-15

## 2025-07-29 ENCOUNTER — SPECIALTY PHARMACY (OUTPATIENT)
Dept: PHARMACY | Facility: CLINIC | Age: 55
End: 2025-07-29

## 2025-07-29 ENCOUNTER — OFFICE VISIT (OUTPATIENT)
Dept: NEUROLOGY | Facility: CLINIC | Age: 55
End: 2025-07-29
Payer: MEDICAID

## 2025-07-29 VITALS
WEIGHT: 190 LBS | DIASTOLIC BLOOD PRESSURE: 82 MMHG | BODY MASS INDEX: 28.14 KG/M2 | SYSTOLIC BLOOD PRESSURE: 112 MMHG | RESPIRATION RATE: 16 BRPM | HEIGHT: 69 IN

## 2025-07-29 DIAGNOSIS — G43.109 MIGRAINE WITH AURA AND WITHOUT STATUS MIGRAINOSUS, NOT INTRACTABLE: Primary | ICD-10-CM

## 2025-07-29 PROCEDURE — 3008F BODY MASS INDEX DOCD: CPT | Performed by: NURSE PRACTITIONER

## 2025-07-29 PROCEDURE — 99214 OFFICE O/P EST MOD 30 MIN: CPT | Performed by: NURSE PRACTITIONER

## 2025-07-29 ASSESSMENT — PATIENT HEALTH QUESTIONNAIRE - PHQ9
2. FEELING DOWN, DEPRESSED OR HOPELESS: SEVERAL DAYS
1. LITTLE INTEREST OR PLEASURE IN DOING THINGS: NOT AT ALL
SUM OF ALL RESPONSES TO PHQ9 QUESTIONS 1 AND 2: 1

## 2025-07-29 ASSESSMENT — PAIN SCALES - GENERAL: PAINLEVEL_OUTOF10: 7

## 2025-07-29 NOTE — ASSESSMENT & PLAN NOTE
Orders:    onabotulinumtoxinA (Botox) 200 unit injection; Inject 155 Units into the muscle every 3 months.

## 2025-07-29 NOTE — PROGRESS NOTES
Chief Complaint   Patient presents with    Migraine    Headache       Subjective   HPI  Joi Castillo is a 54 y.o. year old female who presents with chief complaint Migraine with aura and without status migrainosus, not intractable [G43.109]    Joi is experiencing  30 HA days per month, 30 of the HAs meet migraine criteria.   Triggers include barometric pressure , stress, and humidity.  Aura  The headaches are usually severe and throbbing and are located holocephalic, generally symmetric.   Generally, headaches are constant in duration.  Varying in intensity throughout the day depending on activity and weather patterns.  Associated photophobia, phonophobia, nausea, vomiting, and vestibular symptoms  The headaches are not positional.   Headaches are worsened with exertion. No change in character with sneezing, coughing and bending over.   The current rescue medication Ubrelvy starts to take effect within 2    hours and decreases the headache by 100%.   Patient had an anaphylactic systemic reaction to the Aimovig.  ER notes scanned into chart.  Patient does still present with some redness and puffiness to the right side of her face.        Current/ past HA treatments:  Preventive:  Venlafaxine  Amitriptyline  Gabapentin  Pregabalin  Nurtec  Aimovig 70 mg    Rescue:  Acetaminophen  Ibuprofen  Sumatriptan  Rizatriptan  Ubrelvy    Current Medications[1]    RX Allergies[2]    Medical History[3]  Surgical History[4]  Family History[5]  Social History     Tobacco Use    Smoking status: Every Day     Types: Cigarettes    Smokeless tobacco: Not on file    Tobacco comments:     SMOKES CIGARETTES DAILY   Substance Use Topics    Alcohol use: Not Currently     Comment: Socially     Social History     Substance and Sexual Activity   Drug Use Not Currently        ROS  As noted in HPI, otherwise all other systems have been reviewed are negative for complaint.     Objective   General Appearance: Joi is well-developed,  "well-nourished, 54 y.o. year old female, in no acute distress. Makes good eye contact, is alert, interactive, and cooperative. Demonstrates recent & remote memory recall. Subjective information consistent with objective assessment.     Vitals:    07/29/25 1119   BP: 112/82   Resp: 16   Weight: 86.2 kg (190 lb)   Height: 1.753 m (5' 9\")   PainSc:   7   PainLoc: Head       Lab Results   Component Value Date    WBC 5.3 08/17/2023    RBC 4.34 08/17/2023    HGB 13.2 08/17/2023    HCT 40.7 08/17/2023     08/17/2023     08/17/2023    K 3.9 08/17/2023     08/17/2023    BUN 10 08/17/2023    CREATININE 0.78 08/17/2023    CALCIUM 9.5 08/17/2023    ALKPHOS 69 08/17/2023    AST 19 08/17/2023    ALT 14 08/17/2023        Mental Status  Patient Health Questionnaire-2 Score: 1       Neurological Exam  Cranial Nerves  CN III, IV, VI: Extraocular movements intact bilaterally. Normal lids and orbits bilaterally.  CN VII: Full and symmetric facial movement.  CN VIII: Hearing is normal.    RECORDS REVIEW:  Previous records (provider notes, laboratory reports, and imaging studies were reviewed and summarized).    Assessment & Plan  Migraine with aura and without status migrainosus, not intractable    Orders:    onabotulinumtoxinA (Botox) 200 unit injection; Inject 155 Units into the muscle every 3 months.         ASSESSMENT/PLAN:  Continue Ubrelvy 100 mg for abortive treatment of migraine.  Discontinue Aimovig due to anaphylactic systemic reaction after 1 dose.  Start Botox 155 units every 3 months for preventative treatment of migraine.  Follow-up pending approval of Botox.        Tameka Jaime, APRN-CNP         [1]   Current Outpatient Medications:     amitriptyline (Elavil) 25 mg tablet, Take 1 tablet (25 mg) by mouth., Disp: , Rfl:     atorvastatin (Lipitor) 40 mg tablet, Take 1 tablet (40 mg) by mouth once daily., Disp: , Rfl:     butalbital-aspirin-caffeine (Fiorinal) -40 mg capsule, , Disp: , Rfl:     " clonazePAM (KlonoPIN) 2 mg tablet, Take 1 tablet (2 mg) by mouth., Disp: , Rfl:     Colace 100 mg capsule, 1 capsule (100 mg) once every 24 hours., Disp: , Rfl:     cyanocobalamin (Vitamin B-12) 1,000 mcg/mL injection, Inject 1 mL (1,000 mcg) into the muscle every 30 (thirty) days., Disp: , Rfl:     Dulcolax, bisacodyl, 5 mg EC tablet, 1 tablet (5 mg) once every 24 hours., Disp: , Rfl:     ergocalciferol (Vitamin D-2) 1.25 MG (75814 UT) capsule, Take 1 capsule (50,000 Units) by mouth., Disp: , Rfl:     famotidine (Pepcid) 20 mg tablet, 1 tablet (20 mg)., Disp: , Rfl:     Flonase Allergy Relief 50 mcg/actuation nasal spray, once every 24 hours., Disp: , Rfl:     fluticasone-umeclidin-vilanter (Trelegy Ellipta) 100-62.5-25 mcg blister with device, 1 puff once every 24 hours., Disp: , Rfl:     folic acid (Folvite) 1 mg tablet, , Disp: , Rfl:     gabapentin (Neurontin) 100 mg capsule, Take 1 capsule (100 mg) by mouth twice a day., Disp: , Rfl:     Humira,CF, Pen 40 mg/0.4 mL pen injector kit pen-injector, Inject 1 Pen (40 mg) under the skin., Disp: , Rfl:     hydrOXYzine HCL (Atarax) 25 mg tablet, 1 Tablet Orally Once a day in the afternoon for 90 days, Disp: , Rfl:     Linzess 290 mcg capsule, Take by mouth., Disp: , Rfl:     loratadine (Claritin) 10 mg tablet, Take by mouth., Disp: , Rfl:     Lubricant Eye, PG-, 0.4-0.3 % drops ophthalmic drops, INSTILL INTO THE AFFECTED EYE(S) AS DIRECTED TWICE A DAY FOR 14 DAYS. for 14, Disp: , Rfl:     meclizine (Antivert) 25 mg tablet, Take by mouth., Disp: , Rfl:     metoclopramide (Reglan) 5 mg tablet, TAKE ONE TABLET BY MOUTH BEFORE MEALS TWICE A DAY FOR 10 DAYS for 10, Disp: , Rfl:     Movantik 25 mg, Take by mouth., Disp: , Rfl:     plecanatide (Trulance) tablet tablet, once every 24 hours., Disp: , Rfl:     pregabalin (Lyrica) 50 mg capsule, Take by mouth., Disp: , Rfl:     sennosides (Senokot) 4.3 mg tablet (HALF TABLET), 2 half tablet (1 tablet)., Disp: , Rfl:      sennosides/docusate sodium (VEGETABLE LAX-STOOL SOFTENER ORAL), Take 2 tablets by mouth as needed at bedtime., Disp: , Rfl:     terbinafine (LamISIL) 250 mg tablet, once every 24 hours., Disp: , Rfl:     tiotropium (Spiriva with HandiHaler) 18 mcg inhalation capsule, Place into inhaler and inhale., Disp: , Rfl:     traZODone (Desyrel) 100 mg tablet, Take by mouth., Disp: , Rfl:     venlafaxine (Effexor) 75 mg tablet, 1 tablet (75 mg) once every 24 hours., Disp: , Rfl:     Vyvanse 50 mg capsule, , Disp: , Rfl:     acetaminophen-codeine (Tylenol w/ Codeine #3) 300-30 mg tablet, 1 tablet., Disp: , Rfl:     budesonide-glycopyr-formoterol (Breztri Aerosphere) 160-9-4.8 mcg/actuation HFA aerosol inhaler, 2 puffs, Inhalation, TID, rinse mouth and throat after use, # 10.7 g, Refill(s) 0, Date: 6/20/22 12:31:00 PM EDT, Disp: , Rfl:     carisoprodol (Soma) 350 mg tablet, Take 1 tablet (350 mg) by mouth., Disp: , Rfl:     Dayvigo 10 mg tablet, Take by mouth., Disp: , Rfl:     levothyroxine (Synthroid, Levoxyl) 25 mcg tablet, Take by mouth., Disp: , Rfl:     ondansetron (Zofran) 8 mg tablet, every 8 hours., Disp: , Rfl:     Prempro 0.45-1.5 mg tablet, Take 1 tablet by mouth once daily., Disp: , Rfl:     rimegepant (Nurtec ODT) 75 mg tablet,disintegrating, DISSOLVE ONE TABLET ON THE TONGUE ONCE DAILY AS NEEDED (Patient not taking: Reported on 7/29/2025), Disp: , Rfl:     varenicline (Chantix) 0.5 mg tablet, TAKE ONE TABLET BY MOUTH ONCE A DAY AFTER EATING WITH A FULL GLASS OF WATER for 30, Disp: , Rfl:   [2]   Allergies  Allergen Reactions    Aimovig Autoinjector [Erenumab-Aooe] Anaphylaxis   [3] No past medical history on file.  [4] No past surgical history on file.  [5] No family history on file.

## 2025-08-06 ENCOUNTER — SPECIALTY PHARMACY (OUTPATIENT)
Dept: PHARMACY | Facility: CLINIC | Age: 55
End: 2025-08-06

## 2025-08-07 PROCEDURE — RXMED WILLOW AMBULATORY MEDICATION CHARGE

## 2025-08-11 ENCOUNTER — PHARMACY VISIT (OUTPATIENT)
Dept: PHARMACY | Facility: CLINIC | Age: 55
End: 2025-08-11
Payer: MEDICAID

## 2025-08-11 DIAGNOSIS — G43.109 MIGRAINE WITH AURA AND WITHOUT STATUS MIGRAINOSUS, NOT INTRACTABLE: Primary | ICD-10-CM

## 2025-08-11 RX ORDER — METHYLPREDNISOLONE 4 MG/1
TABLET ORAL
Qty: 21 TABLET | Refills: 0 | Status: SHIPPED | OUTPATIENT
Start: 2025-08-11 | End: 2025-08-17

## 2025-08-19 ENCOUNTER — APPOINTMENT (OUTPATIENT)
Dept: NEUROLOGY | Facility: CLINIC | Age: 55
End: 2025-08-19
Payer: MEDICAID

## 2025-08-19 VITALS
BODY MASS INDEX: 27.85 KG/M2 | HEIGHT: 69 IN | HEART RATE: 106 BPM | DIASTOLIC BLOOD PRESSURE: 85 MMHG | SYSTOLIC BLOOD PRESSURE: 127 MMHG | RESPIRATION RATE: 17 BRPM | WEIGHT: 188 LBS

## 2025-08-19 DIAGNOSIS — G43.109 MIGRAINE WITH AURA AND WITHOUT STATUS MIGRAINOSUS, NOT INTRACTABLE: Primary | ICD-10-CM

## 2025-08-19 PROCEDURE — 64615 CHEMODENERV MUSC MIGRAINE: CPT | Performed by: NURSE PRACTITIONER

## 2025-08-19 PROCEDURE — 96372 THER/PROPH/DIAG INJ SC/IM: CPT | Performed by: NURSE PRACTITIONER

## 2025-08-19 RX ORDER — KETOROLAC TROMETHAMINE 30 MG/ML
60 INJECTION, SOLUTION INTRAMUSCULAR; INTRAVENOUS ONCE
Status: COMPLETED | OUTPATIENT
Start: 2025-08-19 | End: 2025-08-19

## 2025-08-19 RX ADMIN — KETOROLAC TROMETHAMINE 60 MG: 30 INJECTION, SOLUTION INTRAMUSCULAR; INTRAVENOUS at 15:19

## 2025-08-19 ASSESSMENT — PATIENT HEALTH QUESTIONNAIRE - PHQ9
1. LITTLE INTEREST OR PLEASURE IN DOING THINGS: NOT AT ALL
SUM OF ALL RESPONSES TO PHQ9 QUESTIONS 1 AND 2: 0
2. FEELING DOWN, DEPRESSED OR HOPELESS: NOT AT ALL

## 2025-08-19 ASSESSMENT — PAIN SCALES - GENERAL: PAINLEVEL_OUTOF10: 8

## 2025-08-21 ENCOUNTER — TELEPHONE (OUTPATIENT)
Dept: NEUROLOGY | Facility: CLINIC | Age: 55
End: 2025-08-21
Payer: MEDICAID

## 2025-08-21 DIAGNOSIS — G43.109 MIGRAINE WITH AURA AND WITHOUT STATUS MIGRAINOSUS, NOT INTRACTABLE: Primary | ICD-10-CM

## 2025-08-21 RX ORDER — KETOROLAC TROMETHAMINE 10 MG/1
10 TABLET, FILM COATED ORAL EVERY 6 HOURS PRN
Qty: 20 TABLET | Refills: 0 | Status: SHIPPED | OUTPATIENT
Start: 2025-08-21 | End: 2025-08-26

## 2025-09-04 ENCOUNTER — SPECIALTY PHARMACY (OUTPATIENT)
Dept: PHARMACY | Facility: CLINIC | Age: 55
End: 2025-09-04

## 2025-10-01 ENCOUNTER — APPOINTMENT (OUTPATIENT)
Dept: NEUROLOGY | Facility: CLINIC | Age: 55
End: 2025-10-01
Payer: MEDICAID

## 2025-10-14 ENCOUNTER — APPOINTMENT (OUTPATIENT)
Dept: NEUROLOGY | Facility: CLINIC | Age: 55
End: 2025-10-14
Payer: MEDICAID

## 2025-11-04 ENCOUNTER — APPOINTMENT (OUTPATIENT)
Dept: NEUROLOGY | Facility: CLINIC | Age: 55
End: 2025-11-04
Payer: MEDICAID

## 2025-11-18 ENCOUNTER — APPOINTMENT (OUTPATIENT)
Dept: NEUROLOGY | Facility: CLINIC | Age: 55
End: 2025-11-18
Payer: MEDICAID